# Patient Record
Sex: FEMALE | Race: WHITE | Employment: FULL TIME | URBAN - METROPOLITAN AREA
[De-identification: names, ages, dates, MRNs, and addresses within clinical notes are randomized per-mention and may not be internally consistent; named-entity substitution may affect disease eponyms.]

---

## 2017-09-04 ENCOUNTER — APPOINTMENT (OUTPATIENT)
Dept: CT IMAGING | Age: 27
DRG: 392 | End: 2017-09-04
Attending: EMERGENCY MEDICINE
Payer: COMMERCIAL

## 2017-09-04 ENCOUNTER — HOSPITAL ENCOUNTER (INPATIENT)
Age: 27
LOS: 2 days | Discharge: HOME | DRG: 392 | End: 2017-09-06
Attending: EMERGENCY MEDICINE | Admitting: INTERNAL MEDICINE
Payer: COMMERCIAL

## 2017-09-04 DIAGNOSIS — R19.7 DIARRHEA, UNSPECIFIED TYPE: ICD-10-CM

## 2017-09-04 DIAGNOSIS — R10.84 ABDOMINAL PAIN, GENERALIZED: ICD-10-CM

## 2017-09-04 DIAGNOSIS — R11.2 NON-INTRACTABLE VOMITING WITH NAUSEA, UNSPECIFIED VOMITING TYPE: ICD-10-CM

## 2017-09-04 DIAGNOSIS — K52.9 ENTERITIS: Primary | ICD-10-CM

## 2017-09-04 PROBLEM — N17.9 AKI (ACUTE KIDNEY INJURY) (HCC): Status: ACTIVE | Noted: 2017-09-04

## 2017-09-04 PROBLEM — A41.9 SEPSIS (HCC): Status: ACTIVE | Noted: 2017-09-04

## 2017-09-04 PROBLEM — E87.1 HYPONATREMIA: Status: ACTIVE | Noted: 2017-09-04

## 2017-09-04 LAB
ALBUMIN SERPL-MCNC: 3.4 G/DL (ref 3.5–5)
ALBUMIN/GLOB SERPL: 1 {RATIO} (ref 1.1–2.2)
ALP SERPL-CCNC: 93 U/L (ref 45–117)
ALT SERPL-CCNC: 50 U/L (ref 12–78)
ANION GAP SERPL CALC-SCNC: 9 MMOL/L (ref 5–15)
APPEARANCE UR: ABNORMAL
AST SERPL-CCNC: 53 U/L (ref 15–37)
BACTERIA URNS QL MICRO: ABNORMAL /HPF
BASOPHILS # BLD: 0 K/UL
BASOPHILS NFR BLD: 0 %
BILIRUB SERPL-MCNC: 0.6 MG/DL (ref 0.2–1)
BILIRUB UR QL: NEGATIVE
BUN SERPL-MCNC: 24 MG/DL (ref 6–20)
BUN/CREAT SERPL: 18 (ref 12–20)
CALCIUM SERPL-MCNC: 8 MG/DL (ref 8.5–10.1)
CHLORIDE SERPL-SCNC: 101 MMOL/L (ref 97–108)
CO2 SERPL-SCNC: 25 MMOL/L (ref 21–32)
COLOR UR: ABNORMAL
CREAT SERPL-MCNC: 1.35 MG/DL (ref 0.55–1.02)
DIFFERENTIAL METHOD BLD: ABNORMAL
EOSINOPHIL # BLD: 0 K/UL
EOSINOPHIL NFR BLD: 0 %
EPITH CASTS URNS QL MICRO: ABNORMAL /LPF
ERYTHROCYTE [DISTWIDTH] IN BLOOD BY AUTOMATED COUNT: 12.6 % (ref 11.5–14.5)
GLOBULIN SER CALC-MCNC: 3.5 G/DL (ref 2–4)
GLUCOSE SERPL-MCNC: 154 MG/DL (ref 65–100)
GLUCOSE UR STRIP.AUTO-MCNC: NEGATIVE MG/DL
HCG UR QL: NEGATIVE
HCT VFR BLD AUTO: 54.9 % (ref 35–47)
HGB BLD-MCNC: 19.1 G/DL (ref 11.5–16)
HGB UR QL STRIP: NEGATIVE
HYALINE CASTS URNS QL MICRO: ABNORMAL /LPF (ref 0–5)
KETONES UR QL STRIP.AUTO: NEGATIVE MG/DL
LACTATE SERPL-SCNC: 1.4 MMOL/L (ref 0.4–2)
LACTATE SERPL-SCNC: 2.3 MMOL/L (ref 0.4–2)
LEUKOCYTE ESTERASE UR QL STRIP.AUTO: NEGATIVE
LIPASE SERPL-CCNC: 70 U/L (ref 73–393)
LYMPHOCYTES # BLD: 5.6 K/UL
LYMPHOCYTES NFR BLD: 19 %
MCH RBC QN AUTO: 33.2 PG (ref 26–34)
MCHC RBC AUTO-ENTMCNC: 34.8 G/DL (ref 30–36.5)
MCV RBC AUTO: 95.5 FL (ref 80–99)
MONOCYTES # BLD: 2.1 K/UL
MONOCYTES NFR BLD: 7 %
NEUTS BAND NFR BLD MANUAL: 7 %
NEUTS SEG # BLD: 21.7 K/UL
NEUTS SEG NFR BLD: 67 %
NITRITE UR QL STRIP.AUTO: NEGATIVE
PH UR STRIP: 5.5 [PH] (ref 5–8)
PLATELET # BLD AUTO: 367 K/UL (ref 150–400)
POTASSIUM SERPL-SCNC: 4.7 MMOL/L (ref 3.5–5.1)
PROT SERPL-MCNC: 6.9 G/DL (ref 6.4–8.2)
PROT UR STRIP-MCNC: ABNORMAL MG/DL
RBC # BLD AUTO: 5.75 M/UL (ref 3.8–5.2)
RBC #/AREA URNS HPF: ABNORMAL /HPF (ref 0–5)
RBC MORPH BLD: ABNORMAL
SODIUM SERPL-SCNC: 135 MMOL/L (ref 136–145)
SP GR UR REFRACTOMETRY: >1.03 (ref 1–1.03)
UA: UC IF INDICATED,UAUC: ABNORMAL
UROBILINOGEN UR QL STRIP.AUTO: 0.2 EU/DL (ref 0.2–1)
WBC # BLD AUTO: 29.4 K/UL (ref 3.6–11)
WBC URNS QL MICRO: ABNORMAL /HPF (ref 0–4)

## 2017-09-04 PROCEDURE — 65270000015 HC RM PRIVATE ONCOLOGY

## 2017-09-04 PROCEDURE — 74011636320 HC RX REV CODE- 636/320: Performed by: EMERGENCY MEDICINE

## 2017-09-04 PROCEDURE — 74011250636 HC RX REV CODE- 250/636: Performed by: EMERGENCY MEDICINE

## 2017-09-04 PROCEDURE — 74011250636 HC RX REV CODE- 250/636: Performed by: INTERNAL MEDICINE

## 2017-09-04 PROCEDURE — 81025 URINE PREGNANCY TEST: CPT

## 2017-09-04 PROCEDURE — 87209 SMEAR COMPLEX STAIN: CPT | Performed by: INTERNAL MEDICINE

## 2017-09-04 PROCEDURE — 85025 COMPLETE CBC W/AUTO DIFF WBC: CPT | Performed by: EMERGENCY MEDICINE

## 2017-09-04 PROCEDURE — 87493 C DIFF AMPLIFIED PROBE: CPT | Performed by: INTERNAL MEDICINE

## 2017-09-04 PROCEDURE — 74177 CT ABD & PELVIS W/CONTRAST: CPT

## 2017-09-04 PROCEDURE — 93005 ELECTROCARDIOGRAM TRACING: CPT

## 2017-09-04 PROCEDURE — 87086 URINE CULTURE/COLONY COUNT: CPT | Performed by: EMERGENCY MEDICINE

## 2017-09-04 PROCEDURE — 96361 HYDRATE IV INFUSION ADD-ON: CPT

## 2017-09-04 PROCEDURE — 87045 FECES CULTURE AEROBIC BACT: CPT | Performed by: INTERNAL MEDICINE

## 2017-09-04 PROCEDURE — 99285 EMERGENCY DEPT VISIT HI MDM: CPT

## 2017-09-04 PROCEDURE — 81001 URINALYSIS AUTO W/SCOPE: CPT | Performed by: EMERGENCY MEDICINE

## 2017-09-04 PROCEDURE — 89055 LEUKOCYTE ASSESSMENT FECAL: CPT | Performed by: INTERNAL MEDICINE

## 2017-09-04 PROCEDURE — 80053 COMPREHEN METABOLIC PANEL: CPT | Performed by: EMERGENCY MEDICINE

## 2017-09-04 PROCEDURE — 74011250637 HC RX REV CODE- 250/637: Performed by: INTERNAL MEDICINE

## 2017-09-04 PROCEDURE — 36415 COLL VENOUS BLD VENIPUNCTURE: CPT | Performed by: EMERGENCY MEDICINE

## 2017-09-04 PROCEDURE — 83690 ASSAY OF LIPASE: CPT | Performed by: EMERGENCY MEDICINE

## 2017-09-04 PROCEDURE — 96374 THER/PROPH/DIAG INJ IV PUSH: CPT

## 2017-09-04 PROCEDURE — 83605 ASSAY OF LACTIC ACID: CPT | Performed by: EMERGENCY MEDICINE

## 2017-09-04 PROCEDURE — 74011000250 HC RX REV CODE- 250: Performed by: INTERNAL MEDICINE

## 2017-09-04 RX ORDER — ACETAMINOPHEN 325 MG/1
650 TABLET ORAL
Status: DISCONTINUED | OUTPATIENT
Start: 2017-09-04 | End: 2017-09-06 | Stop reason: HOSPADM

## 2017-09-04 RX ORDER — ONDANSETRON 2 MG/ML
4 INJECTION INTRAMUSCULAR; INTRAVENOUS
Status: COMPLETED | OUTPATIENT
Start: 2017-09-04 | End: 2017-09-04

## 2017-09-04 RX ORDER — IBUPROFEN 200 MG
1 TABLET ORAL EVERY 24 HOURS
Status: DISCONTINUED | OUTPATIENT
Start: 2017-09-04 | End: 2017-09-06 | Stop reason: HOSPADM

## 2017-09-04 RX ORDER — SODIUM CHLORIDE 9 MG/ML
100 INJECTION, SOLUTION INTRAVENOUS CONTINUOUS
Status: DISCONTINUED | OUTPATIENT
Start: 2017-09-04 | End: 2017-09-06

## 2017-09-04 RX ORDER — SODIUM CHLORIDE 0.9 % (FLUSH) 0.9 %
5-10 SYRINGE (ML) INJECTION EVERY 8 HOURS
Status: DISCONTINUED | OUTPATIENT
Start: 2017-09-04 | End: 2017-09-06 | Stop reason: HOSPADM

## 2017-09-04 RX ORDER — ENOXAPARIN SODIUM 100 MG/ML
40 INJECTION SUBCUTANEOUS EVERY 24 HOURS
Status: DISCONTINUED | OUTPATIENT
Start: 2017-09-04 | End: 2017-09-06 | Stop reason: HOSPADM

## 2017-09-04 RX ORDER — SODIUM CHLORIDE 0.9 % (FLUSH) 0.9 %
5-10 SYRINGE (ML) INJECTION AS NEEDED
Status: DISCONTINUED | OUTPATIENT
Start: 2017-09-04 | End: 2017-09-06 | Stop reason: HOSPADM

## 2017-09-04 RX ORDER — MORPHINE SULFATE 2 MG/ML
1 INJECTION, SOLUTION INTRAMUSCULAR; INTRAVENOUS
Status: DISCONTINUED | OUTPATIENT
Start: 2017-09-04 | End: 2017-09-06 | Stop reason: HOSPADM

## 2017-09-04 RX ORDER — SODIUM CHLORIDE 0.9 % (FLUSH) 0.9 %
10 SYRINGE (ML) INJECTION
Status: COMPLETED | OUTPATIENT
Start: 2017-09-04 | End: 2017-09-04

## 2017-09-04 RX ORDER — LEVOFLOXACIN 5 MG/ML
500 INJECTION, SOLUTION INTRAVENOUS
Status: COMPLETED | OUTPATIENT
Start: 2017-09-04 | End: 2017-09-04

## 2017-09-04 RX ORDER — METRONIDAZOLE 500 MG/100ML
500 INJECTION, SOLUTION INTRAVENOUS
Status: DISCONTINUED | OUTPATIENT
Start: 2017-09-04 | End: 2017-09-04

## 2017-09-04 RX ORDER — LEVOFLOXACIN 5 MG/ML
750 INJECTION, SOLUTION INTRAVENOUS EVERY 24 HOURS
Status: DISCONTINUED | OUTPATIENT
Start: 2017-09-05 | End: 2017-09-06

## 2017-09-04 RX ORDER — PROPOFOL 10 MG/ML
100 VIAL (ML) INTRAVENOUS
Status: DISCONTINUED | OUTPATIENT
Start: 2017-09-04 | End: 2017-09-04

## 2017-09-04 RX ORDER — ONDANSETRON 2 MG/ML
4 INJECTION INTRAMUSCULAR; INTRAVENOUS
Status: DISCONTINUED | OUTPATIENT
Start: 2017-09-04 | End: 2017-09-06 | Stop reason: HOSPADM

## 2017-09-04 RX ORDER — METRONIDAZOLE 500 MG/100ML
500 INJECTION, SOLUTION INTRAVENOUS EVERY 8 HOURS
Status: DISCONTINUED | OUTPATIENT
Start: 2017-09-04 | End: 2017-09-06

## 2017-09-04 RX ORDER — IBUPROFEN 200 MG
1 TABLET ORAL DAILY
Status: DISCONTINUED | OUTPATIENT
Start: 2017-09-05 | End: 2017-09-04

## 2017-09-04 RX ORDER — SODIUM CHLORIDE 9 MG/ML
50 INJECTION, SOLUTION INTRAVENOUS
Status: COMPLETED | OUTPATIENT
Start: 2017-09-04 | End: 2017-09-04

## 2017-09-04 RX ADMIN — SODIUM CHLORIDE 2000 ML: 900 INJECTION, SOLUTION INTRAVENOUS at 16:32

## 2017-09-04 RX ADMIN — SODIUM CHLORIDE 50 ML/HR: 900 INJECTION, SOLUTION INTRAVENOUS at 18:01

## 2017-09-04 RX ADMIN — Medication 10 ML: at 18:01

## 2017-09-04 RX ADMIN — ONDANSETRON 4 MG: 2 INJECTION INTRAMUSCULAR; INTRAVENOUS at 16:15

## 2017-09-04 RX ADMIN — ONDANSETRON 4 MG: 2 INJECTION INTRAMUSCULAR; INTRAVENOUS at 23:10

## 2017-09-04 RX ADMIN — SODIUM CHLORIDE 1000 ML: 900 INJECTION, SOLUTION INTRAVENOUS at 19:08

## 2017-09-04 RX ADMIN — MORPHINE SULFATE 1 MG: 2 INJECTION, SOLUTION INTRAMUSCULAR; INTRAVENOUS at 23:10

## 2017-09-04 RX ADMIN — IOPAMIDOL 100 ML: 755 INJECTION, SOLUTION INTRAVENOUS at 18:01

## 2017-09-04 RX ADMIN — LEVOFLOXACIN 500 MG: 5 INJECTION, SOLUTION INTRAVENOUS at 19:08

## 2017-09-04 RX ADMIN — METRONIDAZOLE 500 MG: 500 INJECTION, SOLUTION INTRAVENOUS at 21:35

## 2017-09-04 RX ADMIN — SODIUM CHLORIDE 150 ML/HR: 900 INJECTION, SOLUTION INTRAVENOUS at 21:55

## 2017-09-04 RX ADMIN — Medication 10 ML: at 22:52

## 2017-09-04 NOTE — IP AVS SNAPSHOT
[address] [facility] 
[phone] Patient: Celina Pedraza MRN: [id number] RLQ:[id number] You are allergic to the following No active allergies Recent Documentation Height Weight BMI OB Status Smoking Status 1.626 m 76.5 kg 28.95 kg/m2 Having regular periods Current Every Day Smoker Unresulted Labs Order Current Status OVA & PARASITES, STOOL In process Emergency Contacts  (Rel.) Home Phone Work Phone Mobile Phone Wes Oro (Mother) [phone] -- -- About your hospitalization You were admitted on:  September 4, 2017 You last received care in the:  [facility] 1 MEDICAL ONCOLOGY You were discharged on:  September 6, 2017 Why you were hospitalized Your primary diagnosis was:  Not on File Your diagnoses also included:  Sepsis (Hcc), Nausea Vomiting And Diarrhea, Colitis, Enteritis, And Gastroenteritis Of Presumed Infectious Origin, Ervin (Acute Kidney Injury) (Hcc), Hyponatremia, Severe Dehydration Providers Seen During Your Hospitalizations Provider Role Specialty Primary office phone Viola Fontanez MD Attending Provider Emergency Medicine [phone] Yamilex Torres MD Attending Provider Internal Medicine [phone] Your Primary Care Physician (PCP) Primary Care Physician Office Phone Office Fax NONE ** None ** ** None ** Follow-up Information None Current Discharge Medication List  
  
START taking these medications Dose & Instructions Dispensing Information Comments Morning Noon Evening Bedtime  
 ciprofloxacin HCl 500 mg tablet Commonly known as:  CIPRO Your last dose was: Your next dose is:    
   
   
 Dose:  500 mg Take 1 Tab by mouth two (2) times a day for 5 days. Quantity:  10 Tab Refills:  0 L. acidoph & paracasei- S therm- Bifido 8 billion cell Cap cap Commonly known as:  JENNIFFER-Q/RISAQUAD Your last dose was: Your next dose is:    
   
   
 Dose:  1 Cap Take 1 Cap by mouth daily. Quantity:  7 Cap Refills:  0  
     
   
   
   
  
 metroNIDAZOLE 500 mg tablet Commonly known as:  FLAGYL Your last dose was: Your next dose is:    
   
   
 Dose:  500 mg Take 1 Tab by mouth three (3) times daily for 5 days. Quantity:  15 Tab Refills:  0  
     
   
   
   
  
 ondansetron hcl 4 mg tablet Commonly known as:  ZOFRAN (AS HYDROCHLORIDE) Your last dose was: Your next dose is:    
   
   
 Dose:  4 mg Take 1 Tab by mouth every eight (8) hours as needed for Nausea. Quantity:  20 Tab Refills:  0 Where to Get Your Medications Information on where to get these meds will be given to you by the nurse or doctor. ! Ask your nurse or doctor about these medications  
  ciprofloxacin HCl 500 mg tablet L. acidoph & paracasei- S therm- Bifido 8 billion cell Cap cap  
 metroNIDAZOLE 500 mg tablet  
 ondansetron hcl 4 mg tablet Discharge Instructions HOSPITALIST DISCHARGE INSTRUCTIONS 
 
NAME: Lucio Gibson :  1990 MRN:  [id number] Date/Time:  2017 11:44 AM 
 
ADMIT DATE: 2017 DISCHARGE DATE: 2017 DISCHARGE DIAGNOSIS: 
Sepsis POA - resolved Nausea vomiting, diarrhea POA- improved Abdominal pain POA- now just soreness, improved MAYNOR (acute kidney injury) POA- resolved Hyponatremia (mild/borderline) POA- now resolved Due to Enterocolitis POA- on CT - cont PO Abx as prescribed x 5 more days Active Problems: 
  Sepsis ([address] (2017) Nausea vomiting and diarrhea (2017) Colitis, enteritis, and gastroenteritis of presumed infectious origin (2017) MAYNOR (acute kidney injury) ([address] (2017) Hyponatremia (2017) Severe dehydration (9/5/2017) MEDICATIONS: 
As per medication reconciliation  list 
· It is important that you take the medication exactly as they are prescribed. · Keep your medication in the bottles provided by the pharmacist and keep a list of the medication names, dosages, and times to be taken in your wallet. · Do not take other medications without consulting your doctor. Pain Management: per above medications What to do at [facility] Recommended diet:  Regular Diet Recommended activity: Activity as tolerated If you have questions regarding the hospital related prescriptions or hospital related issues please call Harpreet Phillip at . If you experience any of the following symptoms then please call your primary care physician or return to the emergency room if you cannot get hold of your doctor: 
Fever, chills, nausea, vomiting, diarrhea, change in mentation, falling, bleeding, shortness of breath, Follow Up: 
GI doctor of your choice if diarrhea/Abd pain persists beyond 7 days or recurrence happens for no obvious reason-- may need Colonoscopy as discussed Information obtained by : 
I understand that if any problems occur once I am at home I am to contact my physician. I understand and acknowledge receipt of the instructions indicated above. Physician's or R.N.'s Signature                                                                  Date/Time Patient or Representative Signature                                                          Date/Time Discharge Orders None MyChart Announcement  We are excited to announce that we are making your provider's discharge notes available to you in HitchedPic. You will see these notes when they are completed and signed by the physician that discharged you from your recent hospital stay. If you have any questions or concerns about any information you see in HitchedPic, please call the Health Information Department where you were seen or reach out to your Primary Care Provider for more information about your plan of care. Introducing hospitals & [facility] SERVICES! Yovanny Lovelace introduces HitchedPic patient portal. Now you can access parts of your medical record, email your doctor's office, and request medication refills online. 1. In your internet browser, go to [email or web address] 2. Click on the First Time User? Click Here link in the Sign In box. You will see the New Member Sign Up page. 3. Enter your HitchedPic Access Code exactly as it appears below. You will not need to use this code after youve completed the sign-up process. If you do not sign up before the expiration date, you must request a new code. · HitchedPic Access Code: [email or web address] Expires: 12/3/2017  3:46 PM 
 
4. Enter the last four digits of your Social Security Number (xxxx) and Date of Birth (mm/dd/yyyy) as indicated and click Submit. You will be taken to the next sign-up page. 5. Create a HitchedPic ID. This will be your HitchedPic login ID and cannot be changed, so think of one that is secure and easy to remember. 6. Create a HitchedPic password. You can change your password at any time. 7. Enter your Password Reset Question and Answer. This can be used at a later time if you forget your password. 8. Enter your e-mail address. You will receive e-mail notification when new information is available in [address] 9. Click Sign Up. You can now view and download portions of your medical record. 10. Click the Download Summary menu link to download a portable copy of your medical information. If you have questions, please visit the Frequently Asked Questions section of the MyChart website. Remember, MyChart is NOT to be used for urgent needs. For medical emergencies, dial 911. Now available from your iPhone and Android! General Information Please provide this summary of care documentation to your next provider. Patient Signature:  ____________________________________________________________ Date:  ____________________________________________________________  
  
[facility] Provider Signature:  ____________________________________________________________ Date:  ____________________________________________________________

## 2017-09-04 NOTE — ED PROVIDER NOTES
HPI Comments: Tracee Calvillo is a 28 y.o. female without PMHx, presenting ambulatory to ED c/o persistent non-bloody, non-bilious N/V/D with associated 7/10 generalized abdominal cramping pain and chills since last night. Pt notes she attempted taking pedialyte this morning but has been unable to tolerate PO with symptoms. She denies recent sick contacts, PMHx, or abdominal surgeries. Pt notes her most recent menstrual period ended last week. She reports she smokes 1/2 ppd and social EtOH use. Pt specifically denies fever or urinary symptoms. PCP: None  Social Hx: current every day smoker (1/2 ppd); + EtOH (socially); There are no other complaints, changes, or physical findings at this time. Written by CHARLINE Taveras, as dictated by Madonna Allred MD.          The history is provided by the patient. No past medical history on file. No past surgical history on file. Family History:   Problem Relation Age of Onset    No Known Problems Mother     No Known Problems Father        Social History     Social History    Marital status: SINGLE     Spouse name: N/A    Number of children: N/A    Years of education: N/A     Occupational History    Not on file. Social History Main Topics    Smoking status: Current Every Day Smoker    Smokeless tobacco: Not on file    Alcohol use Yes    Drug use: Not on file    Sexual activity: Not on file     Other Topics Concern    Not on file     Social History Narrative         ALLERGIES: Review of patient's allergies indicates no known allergies. Review of Systems   Constitutional: Positive for chills. Negative for activity change and fever. HENT: Negative for congestion and sore throat. Eyes: Negative for pain and redness. Respiratory: Negative for cough, chest tightness and shortness of breath. Cardiovascular: Negative for chest pain and palpitations.    Gastrointestinal: Positive for abdominal pain (generalized cramping), diarrhea, nausea and vomiting. Genitourinary: Negative for dysuria, frequency and urgency. Musculoskeletal: Negative for back pain and neck pain. Skin: Negative for rash. Neurological: Negative for syncope, light-headedness and headaches. Psychiatric/Behavioral: Negative for confusion. All other systems reviewed and are negative. Patient Vitals for the past 12 hrs:   Temp Pulse Resp BP SpO2   09/04/17 1747 - (!) 118 16 111/75 99 %   09/04/17 1700 - - - 122/89 100 %   09/04/17 1615 - (!) 138 - (!) 110/98 97 %   09/04/17 1537 97.7 °F (36.5 °C) (!) 158 18 115/86 97 %     Physical Exam   Constitutional: She is oriented to person, place, and time. She appears well-developed and well-nourished. No distress. HENT:   Head: Normocephalic. Nose: Nose normal.   Mouth/Throat: Oropharynx is clear and moist. No oropharyngeal exudate. Eyes: Conjunctivae are normal. Pupils are equal, round, and reactive to light. No scleral icterus. Neck: Normal range of motion. Neck supple. No JVD present. No tracheal deviation present. No thyromegaly present. Cardiovascular: Regular rhythm and intact distal pulses. Tachycardia present. Exam reveals no gallop and no friction rub. No murmur heard. Pulmonary/Chest: Effort normal and breath sounds normal. No stridor. No respiratory distress. She has no wheezes. She has no rales. Abdominal: Soft. Bowel sounds are normal. She exhibits no distension. There is no tenderness. There is no rebound and no guarding. Musculoskeletal: Normal range of motion. She exhibits no edema. Lymphadenopathy:     She has no cervical adenopathy. Neurological: She is alert and oriented to person, place, and time. No cranial nerve deficit. She exhibits normal muscle tone. Coordination normal.   Skin: Skin is warm and dry. No rash noted. She is not diaphoretic. No erythema. Psychiatric: She has a normal mood and affect. Her behavior is normal.   Nursing note and vitals reviewed. MDM  Number of Diagnoses or Management Options  Abdominal pain, generalized:   Diarrhea, unspecified type:   Enteritis:   Non-intractable vomiting with nausea, unspecified vomiting type:   Diagnosis management comments: DDx: gastroenteritis, metabolic abnormality, dehydration. Amount and/or Complexity of Data Reviewed  Clinical lab tests: ordered and reviewed  Tests in the radiology section of CPT®: ordered and reviewed  Tests in the medicine section of CPT®: ordered and reviewed  Review and summarize past medical records: yes  Discuss the patient with other providers: yes (Hospitalist)  Independent visualization of images, tracings, or specimens: yes    Risk of Complications, Morbidity, and/or Mortality  Presenting problems: high  Diagnostic procedures: high  Management options: high  General comments: Will admit to hospitalist for enteritis; afebrile; pulse improved with aggressive fluid resuscitation; bp's stable; starting iv levaquin and flagyl; Viola Fontanez MD      Critical Care  Total time providing critical care: 30-74 minutes    Patient Progress  Patient progress: stable    Procedures    ED EKG interpretation: 15:39  Rhythm: sinus tachycardia; and regular . Rate (approx.): 142; Axis: normal; NE Interval: normal; QRS interval: normal ; ST/T wave: normal; This EKG was interpreted by Viola Fontanez MD,ED Provider. CONSULT NOTE:   6:45 PM  Viola Fontanez MD spoke with Dr. Drew Perez,   Specialty: Hospitalist  Discussed pt's hx, disposition, and available diagnostic and imaging results. Reviewed care plans. Consultant will evaluate pt for admission.   Written by Ingris Aldana ED Scribe, as dictated by Viola Fontanez MD.     LABORATORY TESTS:  Recent Results (from the past 12 hour(s))   EKG, 12 LEAD, INITIAL    Collection Time: 09/04/17  3:39 PM   Result Value Ref Range    Ventricular Rate 142 BPM    Atrial Rate 142 BPM    P-R Interval 114 ms    QRS Duration 68 ms    Q-T Interval 288 ms    QTC Calculation (Bezet) 443 ms    Calculated P Axis 67 degrees    Calculated R Axis 48 degrees    Calculated T Axis 70 degrees    Diagnosis       Sinus tachycardia  Possible Left atrial enlargement  No previous ECGs available     CBC WITH AUTOMATED DIFF    Collection Time: 09/04/17  4:17 PM   Result Value Ref Range    WBC 29.4 (H) 3.6 - 11.0 K/uL    RBC 5.75 (H) 3.80 - 5.20 M/uL    HGB 19.1 (H) 11.5 - 16.0 g/dL    HCT 54.9 (H) 35.0 - 47.0 %    MCV 95.5 80.0 - 99.0 FL    MCH 33.2 26.0 - 34.0 PG    MCHC 34.8 30.0 - 36.5 g/dL    RDW 12.6 11.5 - 14.5 %    PLATELET 343 779 - 078 K/uL    NEUTROPHILS 67 %    BAND NEUTROPHILS 7 %    LYMPHOCYTES 19 %    MONOCYTES 7 %    EOSINOPHILS 0 %    BASOPHILS 0 %    ABS. NEUTROPHILS 21.7 K/UL    ABS. LYMPHOCYTES 5.6 K/UL    ABS. MONOCYTES 2.1 K/UL    ABS. EOSINOPHILS 0.0 K/UL    ABS. BASOPHILS 0.0 K/UL    DF MANUAL      RBC COMMENTS NORMOCYTIC, NORMOCHROMIC     METABOLIC PANEL, COMPREHENSIVE    Collection Time: 09/04/17  4:17 PM   Result Value Ref Range    Sodium 135 (L) 136 - 145 mmol/L    Potassium 4.7 3.5 - 5.1 mmol/L    Chloride 101 97 - 108 mmol/L    CO2 25 21 - 32 mmol/L    Anion gap 9 5 - 15 mmol/L    Glucose 154 (H) 65 - 100 mg/dL    BUN 24 (H) 6 - 20 MG/DL    Creatinine 1.35 (H) 0.55 - 1.02 MG/DL    BUN/Creatinine ratio 18 12 - 20      GFR est AA 57 (L) >60 ml/min/1.73m2    GFR est non-AA 47 (L) >60 ml/min/1.73m2    Calcium 8.0 (L) 8.5 - 10.1 MG/DL    Bilirubin, total 0.6 0.2 - 1.0 MG/DL    ALT (SGPT) 50 12 - 78 U/L    AST (SGOT) 53 (H) 15 - 37 U/L    Alk.  phosphatase 93 45 - 117 U/L    Protein, total 6.9 6.4 - 8.2 g/dL    Albumin 3.4 (L) 3.5 - 5.0 g/dL    Globulin 3.5 2.0 - 4.0 g/dL    A-G Ratio 1.0 (L) 1.1 - 2.2     LIPASE    Collection Time: 09/04/17  4:17 PM   Result Value Ref Range    Lipase 70 (L) 73 - 393 U/L   LACTIC ACID    Collection Time: 09/04/17  4:17 PM   Result Value Ref Range    Lactic acid 2.3 (HH) 0.4 - 2.0 MMOL/L   URINALYSIS W/ REFLEX CULTURE    Collection Time: 09/04/17  5:41 PM   Result Value Ref Range    Color DARK YELLOW      Appearance CLOUDY (A) CLEAR      Specific gravity >1.030 (H) 1.003 - 1.030    pH (UA) 5.5 5.0 - 8.0      Protein TRACE (A) NEG mg/dL    Glucose NEGATIVE  NEG mg/dL    Ketone NEGATIVE  NEG mg/dL    Bilirubin NEGATIVE  NEG      Blood NEGATIVE  NEG      Urobilinogen 0.2 0.2 - 1.0 EU/dL    Nitrites NEGATIVE  NEG      Leukocyte Esterase NEGATIVE  NEG      WBC 5-10 0 - 4 /hpf    RBC 5-10 0 - 5 /hpf    Epithelial cells MODERATE (A) FEW /lpf    Bacteria 2+ (A) NEG /hpf    UA:UC IF INDICATED URINE CULTURE ORDERED (A) CNI      Hyaline cast 0-2 0 - 5 /lpf   HCG URINE, QL. - POC    Collection Time: 09/04/17  5:41 PM   Result Value Ref Range    Pregnancy test,urine (POC) NEGATIVE  NEG         IMAGING RESULTS:  INDICATION: Low abdominal pain with vomiting and diarrhea; leukocytosis 29     COMPARISON: None     TECHNIQUE:   Following the uneventful intravenous administration of 100 cc Isovue-370, thin  axial images were obtained through the abdomen and pelvis. Coronal and sagittal  reconstructions were generated. Oral contrast was not administered. CT dose  reduction was achieved through use of a standardized protocol tailored for this  examination and automatic exposure control for dose modulation.     FINDINGS:   LUNG BASES: Clear. INCIDENTALLY IMAGED HEART AND MEDIASTINUM: Unremarkable. LIVER: Unremarkable  GALLBLADDER: Unremarkable. SPLEEN: Normal in size. PANCREAS: Unremarkable. ADRENALS: Unremarkable. KIDNEYS: No calculus or hydronephrosis.     STOMACH: Unremarkable. SMALL BOWEL: Normal caliber proximal small bowel. Prominent diffuse thickening  wall distal small bowel. Mild edematous change mesentery. COLON: Mild thickening wall colon. APPENDIX: Slightly distended appendix.     PERITONEUM: Mild to moderate amount of ascites peritoneal cavity greatest in the  pelvis. RETROPERITONEUM: Normal caliber abdominal aorta.  No lymphadenopathy. REPRODUCTIVE ORGANS: Normal uterine size. URINARY BLADDER: Nondistended. BONES: No destructive bone lesion.     IMPRESSION  IMPRESSION:     Diffuse severe distal small bowel enteritis.     Mild colitis.     Ascites throughout the peritoneal cavity greatest in the pelvis.     Findings discussed with Dr. Nieves Morel in the ER.           CRITICAL CARE NOTE :    9:10 PM      IMPENDING DETERIORATION -Cardiovascular and Metabolic    ASSOCIATED RISK FACTORS - Shock, Dysrhythmia, Metabolic changes, Dehydration and Vascular Compromise    MANAGEMENT- Bedside Assessment and Supervision of Care    INTERPRETATION -  ECG, blood pressure    INTERVENTIONS - hemodynamic mngmt and Metobolic interventions    CASE REVIEW - Hospitalist and Medical Sub-Specialist    TREATMENT RESPONSE -Improved    PERFORMED BY - Self        NOTES   :      I have spent 50 minutes of critical care time involved in lab review, consultations with specialist, family decision- making, bedside attention and documentation. During this entire length of time I was immediately available to the patient . Significant sinus tach requiring 3L of fluid resuscitation;       Critical Care: The reason for providing this level of medical care for this critically ill patient was due to a critical illness that impaired one or more vital organ systems, such that there was a high probability of imminent or life threatening deterioration in the patient's condition. This care involved high complexity decision making to assess, manipulate, and support vital system functions, to treat this degree of vital organ system failure, and to prevent further life threatening deterioration of the patients condition.   Nieves Morel MD      MEDICATIONS GIVEN:  Medications   levoFLOXacin (LEVAQUIN) 500 mg in D5W IVPB (500 mg IntraVENous New Bag 9/4/17 1908)   sodium chloride 0.9 % bolus infusion 1,000 mL (1,000 mL IntraVENous New Bag 9/4/17 1908)   0.9% sodium chloride infusion (not administered)   levoFLOXacin (LEVAQUIN) 750 mg in D5W IVPB (not administered)   metroNIDAZOLE (FLAGYL) IVPB premix 500 mg (not administered)   nicotine (NICODERM CQ) 14 mg/24 hr patch 1 Patch (not administered)   sodium chloride 0.9 % bolus infusion 2,000 mL (0 mL IntraVENous IV Completed 9/4/17 1832)   ondansetron (ZOFRAN) injection 4 mg (4 mg IntraVENous Given 9/4/17 1615)   0.9% sodium chloride infusion (0 mL/hr IntraVENous IV Completed 9/4/17 1907)   iopamidol (ISOVUE-370) 76 % injection 100 mL (100 mL IntraVENous Given 9/4/17 1801)   sodium chloride (NS) flush 10 mL (10 mL IntraVENous Given 9/4/17 1801)       IMPRESSION:  1. Enteritis    2. Abdominal pain, generalized    3. Non-intractable vomiting with nausea, unspecified vomiting type    4. Diarrhea, unspecified type      PLAN:  1. Admit to Hospitalist.     6:45 PM  Patient is being admitted to the hospital by Dr. Graciela Scales. The results of their tests and reasons for their admission have been discussed with them and/or available family. They convey agreement and understanding for the need to be admitted and for their admission diagnosis. Consultation has been made with the inpatient physician specialist for hospitalization. Written by Iliana Hartmann ED Scribe, as dictated by Efrain Broussard MD.     This note is prepared by Iliana Hartmann, acting as Scribe for Efrain Broussard MD.    Efrain Broussard MD: The scribe's documentation has been prepared under my direction and personally reviewed by me in its entirety. I confirm that the note above accurately reflects all work, treatment, procedures, and medical decision making performed by me.

## 2017-09-04 NOTE — H&P
Hospitalist Admission Note    NAME: Patti Mancilla   :  1990   MRN:  [id number]     Date/Time:  2017 7:08 PM    Patient PCP: None  ________________________________________________________________________    My assessment of this patient's clinical condition and my plan of care is as follows. Assessment / Plan:  Sepsis POA with leukocytosis, tachycardia and lactic acidosis  Nausea vomiting, diarrhea and abdominal pain  Mild MAYNOR (acute kidney injury)   Hyponatremia  Due to Enterocolitis   Admit  IVF  Check Stool for C.diff, Cultures, WBCs, Ova / Parasite  If doesn't improve then consider GI consult  Serial LA  Monitor lytes  Clear liquid diet for now, advance as tolerated  PRN 1mg IV morphine  IV Levaquin and Flagyl for now  Beta HCG negative    Smoker  Offered nicotine patch    Code Status: Full    DVT Prophylaxis: Lovenox    Baseline: Functional        Subjective:   CHIEF COMPLAINT: Nausea, vomiting, diarrhea and abdominal pain    HISTORY OF PRESENT ILLNESS:     Patti Mancilla is a 28 y.o.  female who presents with Nausea, vomiting, diarrhea and abdominal pain. As per patient, symptoms started yesterday evening. Pt reported constant, nausea, vomiting, diarrhea with ? 10 episodes since last night (she doesn't know the exact count). Pt reported diffuse abdominal pain, cramping in nature, 6-8/10 in intensity, constant. Pt denies any fever, chills, chest pain, problems urination. Pt also denies recent travel, recent abx use, sick contact and eating anything unusual.     We were asked to admit for work up and evaluation of the above problems.      Past medical history: She is unaware about any medical problems as she doesn't followup with any PCP    Past surgical history: Denies history of any pertinent surgeries    Social History   Substance Use Topics    Smoking status: Current Every Day Smoker    Smokeless tobacco: Never Used    Alcohol use Yes        Family History Problem Relation Age of Onset    No Known Problems Mother     No Known Problems Father      No Known Allergies     Prior to Admission medications    Not on File       REVIEW OF SYSTEMS:     I am not able to complete the review of systems because: The patient is intubated and sedated    The patient has altered mental status due to his acute medical problems    The patient has baseline aphasia from prior stroke(s)    The patient has baseline dementia and is not reliable historian    The patient is in acute medical distress and unable to provide information           Total of 12 systems reviewed as follows:       POSITIVE= underlined text  Negative = text not underlined  General:  fever, chills, sweats, generalized weakness, weight loss/gain,      loss of appetite   Eyes:    blurred vision, eye pain, loss of vision, double vision  ENT:    rhinorrhea, pharyngitis   Respiratory:   cough, sputum production, SOB, VITAL, wheezing, pleuritic pain   Cardiology:   chest pain, palpitations, orthopnea, PND, edema, syncope   Gastrointestinal:  abdominal pain , N/V, diarrhea, dysphagia, constipation, bleeding   Genitourinary:  frequency, urgency, dysuria, hematuria, incontinence   Muskuloskeletal :  arthralgia, myalgia, back pain  Hematology:  easy bruising, nose or gum bleeding, lymphadenopathy   Dermatological: rash, ulceration, pruritis, color change / jaundice  Endocrine:   hot flashes or polydipsia   Neurological:  headache, dizziness, confusion, focal weakness, paresthesia,     Speech difficulties, memory loss, gait difficulty  Psychological: Feelings of anxiety, depression, agitation    Objective:   VITALS:    Visit Vitals    /75    Pulse (!) 118    Temp 97.7 °F (36.5 °C)    Resp 16    Ht 5' 4\" (1.626 m)    Wt 76.5 kg (168 lb 10.4 oz)    SpO2 99%    BMI 28.95 kg/m2       PHYSICAL EXAM:    General:    Alert, cooperative, no distress, appears stated age.      HEENT: Atraumatic, anicteric sclerae, pink conjunctivae     No oral ulcers, mucosa dry, throat clear, dentition fair  Neck:  Supple, symmetrical,  thyroid: non tender  Lungs:   Clear to auscultation bilaterally. No Wheezing or Rhonchi. No rales. Chest wall:  No tenderness  No Accessory muscle use. Heart:   Regular  rhythm,  No  murmur   No edema  Abdomen:   Soft, non-tender. Not distended. Bowel sounds normal  Extremities: No cyanosis. No clubbing,      Skin turgor normal, Capillary refill normal, Radial dial pulse 2+  Skin:     Not pale. Not Jaundiced  No rashes   Psych:  Good insight. Not depressed. Not anxious or agitated. Neurologic: EOMs intact. No facial asymmetry. No aphasia or slurred speech. Symmetrical strength, Sensation grossly intact. Alert and oriented X 4.     _______________________________________________________________________  Care Plan discussed with:    Comments   Patient y    Family      RN y    Care Manager                    Consultant:  chris ED physician   _______________________________________________________________________  Expected  Disposition:   Home with Family y   HH/PT/OT/RN    SNF/LTC    RODNEY    ________________________________________________________________________  TOTAL TIME: 61 Minutes    Critical Care Provided     Minutes non procedure based      Comments    y Reviewed previous records   >50% of visit spent in counseling and coordination of care y Discussion with patient and family and questions answered       ________________________________________________________________________  Signed: Meghann Gallegos MD    Procedures: see electronic medical records for all procedures/Xrays and details which were not copied into this note but were reviewed prior to creation of Plan.     LAB DATA REVIEWED:    Recent Results (from the past 24 hour(s))   EKG, 12 LEAD, INITIAL    Collection Time: 09/04/17  3:39 PM   Result Value Ref Range    Ventricular Rate 142 BPM    Atrial Rate 142 BPM    P-R Interval 114 ms    QRS Duration 68 ms    Q-T Interval 288 ms    QTC Calculation (Bezet) 443 ms    Calculated P Axis 67 degrees    Calculated R Axis 48 degrees    Calculated T Axis 70 degrees    Diagnosis       Sinus tachycardia  Possible Left atrial enlargement  No previous ECGs available     CBC WITH AUTOMATED DIFF    Collection Time: 09/04/17  4:17 PM   Result Value Ref Range    WBC 29.4 (H) 3.6 - 11.0 K/uL    RBC 5.75 (H) 3.80 - 5.20 M/uL    HGB 19.1 (H) 11.5 - 16.0 g/dL    HCT 54.9 (H) 35.0 - 47.0 %    MCV 95.5 80.0 - 99.0 FL    MCH 33.2 26.0 - 34.0 PG    MCHC 34.8 30.0 - 36.5 g/dL    RDW 12.6 11.5 - 14.5 %    PLATELET 262 662 - 743 K/uL    NEUTROPHILS 67 %    BAND NEUTROPHILS 7 %    LYMPHOCYTES 19 %    MONOCYTES 7 %    EOSINOPHILS 0 %    BASOPHILS 0 %    ABS. NEUTROPHILS 21.7 K/UL    ABS. LYMPHOCYTES 5.6 K/UL    ABS. MONOCYTES 2.1 K/UL    ABS. EOSINOPHILS 0.0 K/UL    ABS. BASOPHILS 0.0 K/UL    DF MANUAL      RBC COMMENTS NORMOCYTIC, NORMOCHROMIC     METABOLIC PANEL, COMPREHENSIVE    Collection Time: 09/04/17  4:17 PM   Result Value Ref Range    Sodium 135 (L) 136 - 145 mmol/L    Potassium 4.7 3.5 - 5.1 mmol/L    Chloride 101 97 - 108 mmol/L    CO2 25 21 - 32 mmol/L    Anion gap 9 5 - 15 mmol/L    Glucose 154 (H) 65 - 100 mg/dL    BUN 24 (H) 6 - 20 MG/DL    Creatinine 1.35 (H) 0.55 - 1.02 MG/DL    BUN/Creatinine ratio 18 12 - 20      GFR est AA 57 (L) >60 ml/min/1.73m2    GFR est non-AA 47 (L) >60 ml/min/1.73m2    Calcium 8.0 (L) 8.5 - 10.1 MG/DL    Bilirubin, total 0.6 0.2 - 1.0 MG/DL    ALT (SGPT) 50 12 - 78 U/L    AST (SGOT) 53 (H) 15 - 37 U/L    Alk.  phosphatase 93 45 - 117 U/L    Protein, total 6.9 6.4 - 8.2 g/dL    Albumin 3.4 (L) 3.5 - 5.0 g/dL    Globulin 3.5 2.0 - 4.0 g/dL    A-G Ratio 1.0 (L) 1.1 - 2.2     LIPASE    Collection Time: 09/04/17  4:17 PM   Result Value Ref Range    Lipase 70 (L) 73 - 393 U/L   LACTIC ACID    Collection Time: 09/04/17  4:17 PM   Result Value Ref Range    Lactic acid 2.3 (HH) 0.4 - 2.0 MMOL/L   URINALYSIS W/ REFLEX CULTURE    Collection Time: 09/04/17  5:41 PM   Result Value Ref Range    Color DARK YELLOW      Appearance CLOUDY (A) CLEAR      Specific gravity >1.030 (H) 1.003 - 1.030    pH (UA) 5.5 5.0 - 8.0      Protein TRACE (A) NEG mg/dL    Glucose NEGATIVE  NEG mg/dL    Ketone NEGATIVE  NEG mg/dL    Bilirubin NEGATIVE  NEG      Blood NEGATIVE  NEG      Urobilinogen 0.2 0.2 - 1.0 EU/dL    Nitrites NEGATIVE  NEG      Leukocyte Esterase NEGATIVE  NEG      WBC 5-10 0 - 4 /hpf    RBC 5-10 0 - 5 /hpf    Epithelial cells MODERATE (A) FEW /lpf    Bacteria 2+ (A) NEG /hpf    UA:UC IF INDICATED URINE CULTURE ORDERED (A) CNI      Hyaline cast 0-2 0 - 5 /lpf   HCG URINE, QL. - POC    Collection Time: 09/04/17  5:41 PM   Result Value Ref Range    Pregnancy test,urine (POC) NEGATIVE  NEG

## 2017-09-05 PROBLEM — E86.0 SEVERE DEHYDRATION: Status: ACTIVE | Noted: 2017-09-05

## 2017-09-05 LAB
ALBUMIN SERPL-MCNC: 2.4 G/DL (ref 3.5–5)
ALBUMIN/GLOB SERPL: 0.9 {RATIO} (ref 1.1–2.2)
ALP SERPL-CCNC: 60 U/L (ref 45–117)
ALT SERPL-CCNC: 35 U/L (ref 12–78)
ANION GAP SERPL CALC-SCNC: 7 MMOL/L (ref 5–15)
AST SERPL-CCNC: 27 U/L (ref 15–37)
ATRIAL RATE: 142 BPM
BASOPHILS # BLD: 0 K/UL (ref 0–0.1)
BASOPHILS NFR BLD: 0 % (ref 0–1)
BILIRUB SERPL-MCNC: 0.5 MG/DL (ref 0.2–1)
BUN SERPL-MCNC: 15 MG/DL (ref 6–20)
BUN/CREAT SERPL: 22 (ref 12–20)
C DIFF TOX GENS STL QL NAA+PROBE: NEGATIVE
CALCIUM SERPL-MCNC: 6.7 MG/DL (ref 8.5–10.1)
CALCULATED P AXIS, ECG09: 67 DEGREES
CALCULATED R AXIS, ECG10: 48 DEGREES
CALCULATED T AXIS, ECG11: 70 DEGREES
CHLORIDE SERPL-SCNC: 111 MMOL/L (ref 97–108)
CO2 SERPL-SCNC: 21 MMOL/L (ref 21–32)
CREAT SERPL-MCNC: 0.69 MG/DL (ref 0.55–1.02)
DIAGNOSIS, 93000: NORMAL
EOSINOPHIL # BLD: 0.1 K/UL (ref 0–0.4)
EOSINOPHIL NFR BLD: 1 % (ref 0–7)
ERYTHROCYTE [DISTWIDTH] IN BLOOD BY AUTOMATED COUNT: 13 % (ref 11.5–14.5)
GLOBULIN SER CALC-MCNC: 2.6 G/DL (ref 2–4)
GLUCOSE SERPL-MCNC: 104 MG/DL (ref 65–100)
HCT VFR BLD AUTO: 41.8 % (ref 35–47)
HGB BLD-MCNC: 14.5 G/DL (ref 11.5–16)
LACTATE SERPL-SCNC: 0.6 MMOL/L (ref 0.4–2)
LYMPHOCYTES # BLD: 4.2 K/UL (ref 0.8–3.5)
LYMPHOCYTES NFR BLD: 24 % (ref 12–49)
MCH RBC QN AUTO: 32.6 PG (ref 26–34)
MCHC RBC AUTO-ENTMCNC: 34.7 G/DL (ref 30–36.5)
MCV RBC AUTO: 93.9 FL (ref 80–99)
MONOCYTES # BLD: 1.4 K/UL (ref 0–1)
MONOCYTES NFR BLD: 8 % (ref 5–13)
NEUTS SEG # BLD: 11.5 K/UL (ref 1.8–8)
NEUTS SEG NFR BLD: 67 % (ref 32–75)
P-R INTERVAL, ECG05: 114 MS
PLATELET # BLD AUTO: 243 K/UL (ref 150–400)
POTASSIUM SERPL-SCNC: 3.8 MMOL/L (ref 3.5–5.1)
PROT SERPL-MCNC: 5 G/DL (ref 6.4–8.2)
Q-T INTERVAL, ECG07: 288 MS
QRS DURATION, ECG06: 68 MS
QTC CALCULATION (BEZET), ECG08: 443 MS
RBC # BLD AUTO: 4.45 M/UL (ref 3.8–5.2)
SODIUM SERPL-SCNC: 139 MMOL/L (ref 136–145)
VENTRICULAR RATE, ECG03: 142 BPM
WBC # BLD AUTO: 17.2 K/UL (ref 3.6–11)

## 2017-09-05 PROCEDURE — 85025 COMPLETE CBC W/AUTO DIFF WBC: CPT | Performed by: INTERNAL MEDICINE

## 2017-09-05 PROCEDURE — 77030018836 HC SOL IRR NACL ICUM -A

## 2017-09-05 PROCEDURE — 65270000015 HC RM PRIVATE ONCOLOGY

## 2017-09-05 PROCEDURE — 74011000250 HC RX REV CODE- 250: Performed by: INTERNAL MEDICINE

## 2017-09-05 PROCEDURE — 83605 ASSAY OF LACTIC ACID: CPT | Performed by: INTERNAL MEDICINE

## 2017-09-05 PROCEDURE — 74011250637 HC RX REV CODE- 250/637: Performed by: INTERNAL MEDICINE

## 2017-09-05 PROCEDURE — 74011250636 HC RX REV CODE- 250/636: Performed by: INTERNAL MEDICINE

## 2017-09-05 PROCEDURE — 36415 COLL VENOUS BLD VENIPUNCTURE: CPT | Performed by: INTERNAL MEDICINE

## 2017-09-05 PROCEDURE — 80053 COMPREHEN METABOLIC PANEL: CPT | Performed by: INTERNAL MEDICINE

## 2017-09-05 RX ADMIN — MORPHINE SULFATE 1 MG: 2 INJECTION, SOLUTION INTRAMUSCULAR; INTRAVENOUS at 20:50

## 2017-09-05 RX ADMIN — SODIUM CHLORIDE 150 ML/HR: 900 INJECTION, SOLUTION INTRAVENOUS at 06:17

## 2017-09-05 RX ADMIN — METRONIDAZOLE 500 MG: 500 INJECTION, SOLUTION INTRAVENOUS at 03:49

## 2017-09-05 RX ADMIN — Medication 10 ML: at 06:19

## 2017-09-05 RX ADMIN — LEVOFLOXACIN 750 MG: 5 INJECTION, SOLUTION INTRAVENOUS at 18:35

## 2017-09-05 RX ADMIN — Medication 1 CAPSULE: at 09:29

## 2017-09-05 RX ADMIN — Medication 10 ML: at 13:04

## 2017-09-05 RX ADMIN — ACETAMINOPHEN 650 MG: 325 TABLET ORAL at 22:20

## 2017-09-05 RX ADMIN — Medication 10 ML: at 21:00

## 2017-09-05 RX ADMIN — METRONIDAZOLE 500 MG: 500 INJECTION, SOLUTION INTRAVENOUS at 13:02

## 2017-09-05 RX ADMIN — METRONIDAZOLE 500 MG: 500 INJECTION, SOLUTION INTRAVENOUS at 20:46

## 2017-09-05 RX ADMIN — SODIUM CHLORIDE 100 ML/HR: 900 INJECTION, SOLUTION INTRAVENOUS at 17:06

## 2017-09-05 RX ADMIN — ONDANSETRON 4 MG: 2 INJECTION INTRAMUSCULAR; INTRAVENOUS at 04:09

## 2017-09-05 RX ADMIN — MORPHINE SULFATE 1 MG: 2 INJECTION, SOLUTION INTRAMUSCULAR; INTRAVENOUS at 04:09

## 2017-09-05 RX ADMIN — MORPHINE SULFATE 1 MG: 2 INJECTION, SOLUTION INTRAMUSCULAR; INTRAVENOUS at 09:56

## 2017-09-05 NOTE — PROGRESS NOTES
Problem: Falls - Risk of  Goal: *Absence of Falls  Document Mary Fall Risk and appropriate interventions in the flowsheet.    Outcome: Progressing Towards Goal  Fall Risk Interventions:              Medication Interventions: Patient to call before getting OOB, Teach patient to arise slowly

## 2017-09-05 NOTE — ED NOTES
Bedside and Verbal shift change report given to [address] [place] [address] Avenue (oncoming nurse) by Sandy Dunaway (offgoing nurse). Report included the following information SBAR, ED Summary, Intake/Output, MAR and Recent Results. Monitor x 2. Call bell in reach. Bed in lowest position, with side rails up x 2. Pt updated on plan of care. Family at bedside. IV ABT infusing as ordered. Third bolus infusing then maintenance fluid to be started. Peng Early

## 2017-09-05 NOTE — PROGRESS NOTES
Oncology Nursing Communication Tool      7:00 AM  9/5/2017     Bedside shift change report given to Viet Horton RN (incoming nurse) by Columba Hyatt RN (outgoing nurse) on Ana Cristina Simon a 28 y.o. female who was admitted on 9/4/2017  3:42 PM. Report included the following information SBAR, Intake/Output, MAR, Recent Results and Med Rec Status. Significant changes during shift: Symptoms managed      Issues for physician to address: N/A            Code Status: Full Code     Infections: No current active infections     Allergies: Review of patient's allergies indicates no known allergies.      Current diet: DIET CLEAR LIQUID       Pain Controlled [x] yes [] no   Bowel Movement [x] yes [] no   Last Bowel Movement (date)     9/5            Vital Signs:   Patient Vitals for the past 12 hrs:   Temp Pulse Resp BP SpO2   09/04/17 2238 98.5 °F (36.9 °C) (!) 101 17 114/60 98 %   09/04/17 2001 - - - 110/84 98 %   09/04/17 1915 - - - 125/79 98 %      Intake & Output:     Intake/Output Summary (Last 24 hours) at 09/05/17 0700  Last data filed at 09/05/17 0650   Gross per 24 hour   Intake           1777.5 ml   Output                0 ml   Net           1777.5 ml      Laboratory Results:     Recent Results (from the past 12 hour(s))   LACTIC ACID    Collection Time: 09/04/17  8:40 PM   Result Value Ref Range    Lactic acid 1.4 0.4 - 2.0 MMOL/L   METABOLIC PANEL, COMPREHENSIVE    Collection Time: 09/05/17  3:59 AM   Result Value Ref Range    Sodium 139 136 - 145 mmol/L    Potassium 3.8 3.5 - 5.1 mmol/L    Chloride 111 (H) 97 - 108 mmol/L    CO2 21 21 - 32 mmol/L    Anion gap 7 5 - 15 mmol/L    Glucose 104 (H) 65 - 100 mg/dL    BUN 15 6 - 20 MG/DL    Creatinine 0.69 0.55 - 1.02 MG/DL    BUN/Creatinine ratio 22 (H) 12 - 20      GFR est AA >60 >60 ml/min/1.73m2    GFR est non-AA >60 >60 ml/min/1.73m2    Calcium 6.7 (L) 8.5 - 10.1 MG/DL    Bilirubin, total 0.5 0.2 - 1.0 MG/DL    ALT (SGPT) 35 12 - 78 U/L AST (SGOT) 27 15 - 37 U/L    Alk. phosphatase 60 45 - 117 U/L    Protein, total 5.0 (L) 6.4 - 8.2 g/dL    Albumin 2.4 (L) 3.5 - 5.0 g/dL    Globulin 2.6 2.0 - 4.0 g/dL    A-G Ratio 0.9 (L) 1.1 - 2.2     CBC WITH AUTOMATED DIFF    Collection Time: 09/05/17  3:59 AM   Result Value Ref Range    WBC 17.2 (H) 3.6 - 11.0 K/uL    RBC 4.45 3.80 - 5.20 M/uL    HGB 14.5 11.5 - 16.0 g/dL    HCT 41.8 35.0 - 47.0 %    MCV 93.9 80.0 - 99.0 FL    MCH 32.6 26.0 - 34.0 PG    MCHC 34.7 30.0 - 36.5 g/dL    RDW 13.0 11.5 - 14.5 %    PLATELET 349 233 - 334 K/uL    NEUTROPHILS 67 32 - 75 %    LYMPHOCYTES 24 12 - 49 %    MONOCYTES 8 5 - 13 %    EOSINOPHILS 1 0 - 7 %    BASOPHILS 0 0 - 1 %    ABS. NEUTROPHILS 11.5 (H) 1.8 - 8.0 K/UL    ABS. LYMPHOCYTES 4.2 (H) 0.8 - 3.5 K/UL    ABS. MONOCYTES 1.4 (H) 0.0 - 1.0 K/UL    ABS. EOSINOPHILS 0.1 0.0 - 0.4 K/UL    ABS. BASOPHILS 0.0 0.0 - 0.1 K/UL   LACTIC ACID    Collection Time: 09/05/17  3:59 AM   Result Value Ref Range    Lactic acid 0.6 0.4 - 2.0 MMOL/L              Opportunity for questions and clarifications were given to the incoming nurse. Patient's bed is in low position, side rails x2, door open PRN, call bell within reach and patient not in distress.       Tristan Locke RN

## 2017-09-05 NOTE — ED NOTES
TRANSFER - OUT REPORT:    Verbal report given to KATHLEEN Rocha(name) on Iggy Resendiz  being transferred to 1118(unit) for routine progression of care       Report consisted of patients Situation, Background, Assessment and   Recommendations(SBAR). Information from the following report(s) SBAR, Kardex and MAR was reviewed with the receiving nurse. Lines:   Peripheral IV 09/04/17 Left;Upper Arm (Active)   Site Assessment Clean, dry, & intact 9/4/2017  4:34 PM   Dressing Status Clean, dry, & intact 9/4/2017  4:34 PM        Opportunity for questions and clarification was provided.       Patient transported with:  transportation

## 2017-09-05 NOTE — PROGRESS NOTES
Hospitalist Progress Note    NAME: Yordan Morris   :  1990   MRN:  [id number]       Assessment / Plan:  Sepsis POA - resolved  Nausea vomiting, diarrhea POA- improved  Abdominal pain POA- now just soreness, improved  MAYNOR (acute kidney injury) POA- resolved  Hyponatremia (mild/borderline) POA- now resolved  Due to Enterocolitis POA- on CT    Decrease IVF today  Follow Stool for C.diff, Cultures, WBCs, Ova / Parasite, contact precautions for now  Tolerating Clear liquid diet , wants to eat more, will advance diet to GI lite  PRN 1mg IV morphine  IV Levaquin and Flagyl for now- change to PO in 24 hrs if improved  Add [place]ari Q    Smoker  Offered nicotine patch       Code status: Full  Prophylaxis: Lovenox  Recommended Disposition: Home w/Family     Subjective:     Chief Complaint / Reason for Physician Visit: F/U Diarrhea, enterocolitis  \"I feel little better\". Discussed with RN events overnight. Review of Systems:  Symptom Y/N Comments  Symptom Y/N Comments   Fever/Chills n   Chest Pain n    Poor Appetite n   Edema n    Cough n   Abdominal Pain y soreness   Sputum n   Joint Pain n    SOB/VITAL n   Pruritis/Rash     Nausea/vomit n   Tolerating PT/OT y    Diarrhea y   Tolerating Diet y GI lite   Constipation    Other       Could NOT obtain due to:      Objective:     VITALS:   Last 24hrs VS reviewed since prior progress note.  Most recent are:  Patient Vitals for the past 24 hrs:   Temp Pulse Resp BP SpO2   17 0800 97.7 °F (36.5 °C) 87 16 110/71 99 %   17 2238 98.5 °F (36.9 °C) (!) 101 17 114/60 98 %   17 - - - 110/84 98 %   17 1915 - - - 125/79 98 %   17 1747 - (!) 118 16 111/75 99 %   17 1700 - - - 122/89 100 %   17 1615 - (!) 138 - (!) 110/98 97 %   17 1537 97.7 °F (36.5 °C) (!) 158 18 115/86 97 %       Intake/Output Summary (Last 24 hours) at 17 1405  Last data filed at 17 0650   Gross per 24 hour   Intake           1777.5 ml   Output 0 ml   Net           1777.5 ml        PHYSICAL EXAM:  General: WD, WN. Alert, cooperative, no acute distress    EENT:  EOMI. Anicteric sclerae. MMM  Resp:  CTA bilaterally, no wheezing or rales. No accessory muscle use  CV:  Regular  rhythm,  No edema  GI:  Soft, Non distended, RLQ tenderness noted +.  +Bowel sounds  Neurologic:  Alert and oriented X 3, normal speech,   Psych:   Good insight. Not anxious nor agitated  Skin:  No rashes. No jaundice    Reviewed most current lab test results and cultures  YES  Reviewed most current radiology test results   YES  Review and summation of old records today    NO  Reviewed patient's current orders and MAR    YES  PMH/SH reviewed - no change compared to H&P  ________________________________________________________________________  Care Plan discussed with:    Comments   Patient x    Family  x Mother at bedside   RN x    Care Manager     Consultant                        Multidiciplinary team rounds were held today with , nursing, pharmacist and clinical coordinator. Patient's plan of care was discussed; medications were reviewed and discharge planning was addressed. ________________________________________________________________________  Total NON critical care TIME:  36   Minutes    Total CRITICAL CARE TIME Spent:   Minutes non procedure based      Comments   >50% of visit spent in counseling and coordination of care     ________________________________________________________________________  Nazanin Allison MD     Procedures: see electronic medical records for all procedures/Xrays and details which were not copied into this note but were reviewed prior to creation of Plan. LABS:  I reviewed today's most current labs and imaging studies.   Pertinent labs include:  Recent Labs      09/05/17   0359  09/04/17   1617   WBC  17.2*  29.4*   HGB  14.5  19.1*   HCT  41.8  54.9*   PLT  243  367     Recent Labs      09/05/17   0359  09/04/17 1617   NA  139  135*   K  3.8  4.7   CL  111*  101   CO2  21  25   GLU  104*  154*   BUN  15  24*   CREA  0.69  1.35*   CA  6.7*  8.0*   ALB  2.4*  3.4*   TBILI  0.5  0.6   SGOT  27  53*   ALT  35  50       Signed: Anna Mera MD

## 2017-09-05 NOTE — PROGRESS NOTES
Oncology Nursing Communication Tool      7:32 PM  9/5/2017     Bedside and Verbal shift change report given to Kendra Garcia RN (incoming nurse) by Kermit Martinez (outgoing nurse) on Maye Dubose a 28 y.o. female who was admitted on 9/4/2017  3:42 PM. Report included the following information SBAR, Kardex, Intake/Output, MAR, Accordion and Recent Results. Significant changes during shift: Pain controlled, tolerating antibiotics well      Issues for physician to address: None            Code Status: Full Code     Infections: No current active infections     Allergies: Review of patient's allergies indicates no known allergies. Current diet: DIET GI LITE (POST SURGICAL)       Pain Controlled [x] yes [] no   Bowel Movement [] yes [x] no   Last Bowel Movement (date)     09/04/17            Vital Signs:   Patient Vitals for the past 12 hrs:   Temp Pulse Resp BP SpO2   09/05/17 1430 97.6 °F (36.4 °C) 92 16 104/64 98 %   09/05/17 0800 97.7 °F (36.5 °C) 87 16 110/71 99 %      Intake & Output:     Intake/Output Summary (Last 24 hours) at 09/05/17 1932  Last data filed at 09/05/17 0650   Gross per 24 hour   Intake           1777.5 ml   Output                0 ml   Net           1777.5 ml      Laboratory Results:   No results found for this or any previous visit (from the past 12 hour(s)). Opportunity for questions and clarifications were given to the incoming nurse. Patient's bed is in low position, side rails x2, door open PRN, call bell within reach and patient not in distress.       Kermit Martinez

## 2017-09-05 NOTE — PROGRESS NOTES
TRANSFER - IN REPORT:    Verbal report received from Chula(name) on [place] Reyes  being received from ED(unit) for routine progression of care      Report consisted of patients Situation, Background, Assessment and   Recommendations(SBAR). Information from the following report(s) SBAR, Kardex, Intake/Output, MAR and Recent Results was reviewed with the receiving nurse. Opportunity for questions and clarification was provided. Assessment completed upon patients arrival to unit and care assumed.        Primary Nurse Nayeli Batista RN and Lio Elise RN performed a dual skin assessment on this patient No impairment noted  Pankaj score is 23

## 2017-09-06 VITALS
RESPIRATION RATE: 18 BRPM | HEART RATE: 86 BPM | SYSTOLIC BLOOD PRESSURE: 112 MMHG | BODY MASS INDEX: 28.79 KG/M2 | HEIGHT: 64 IN | DIASTOLIC BLOOD PRESSURE: 65 MMHG | TEMPERATURE: 98.1 F | WEIGHT: 168.65 LBS | OXYGEN SATURATION: 92 %

## 2017-09-06 LAB
BACTERIA SPEC CULT: NORMAL
BACTERIA SPEC CULT: NORMAL
C JEJUNI+C COLI AG STL QL: NEGATIVE
CC UR VC: NORMAL
E COLI SXT1+2 STL IA: NEGATIVE
ERYTHROCYTE [DISTWIDTH] IN BLOOD BY AUTOMATED COUNT: 12.8 % (ref 11.5–14.5)
HCT VFR BLD AUTO: 36.4 % (ref 35–47)
HGB BLD-MCNC: 12.5 G/DL (ref 11.5–16)
MCH RBC QN AUTO: 33.2 PG (ref 26–34)
MCHC RBC AUTO-ENTMCNC: 34.3 G/DL (ref 30–36.5)
MCV RBC AUTO: 96.8 FL (ref 80–99)
PLATELET # BLD AUTO: 209 K/UL (ref 150–400)
RBC # BLD AUTO: 3.76 M/UL (ref 3.8–5.2)
SERVICE CMNT-IMP: NORMAL
SERVICE CMNT-IMP: NORMAL
WBC # BLD AUTO: 9.6 K/UL (ref 3.6–11)
WBC #/AREA STL HPF: NORMAL /HPF (ref 0–4)

## 2017-09-06 PROCEDURE — 85027 COMPLETE CBC AUTOMATED: CPT | Performed by: INTERNAL MEDICINE

## 2017-09-06 PROCEDURE — 74011000250 HC RX REV CODE- 250: Performed by: INTERNAL MEDICINE

## 2017-09-06 PROCEDURE — 36415 COLL VENOUS BLD VENIPUNCTURE: CPT | Performed by: INTERNAL MEDICINE

## 2017-09-06 PROCEDURE — 74011250637 HC RX REV CODE- 250/637: Performed by: INTERNAL MEDICINE

## 2017-09-06 PROCEDURE — 74011250636 HC RX REV CODE- 250/636: Performed by: INTERNAL MEDICINE

## 2017-09-06 RX ORDER — METRONIDAZOLE 500 MG/1
500 TABLET ORAL 3 TIMES DAILY
Qty: 15 TAB | Refills: 0 | Status: SHIPPED | OUTPATIENT
Start: 2017-09-06 | End: 2017-09-11

## 2017-09-06 RX ORDER — CIPROFLOXACIN 500 MG/1
500 TABLET ORAL 2 TIMES DAILY
Qty: 10 TAB | Refills: 0 | Status: SHIPPED | OUTPATIENT
Start: 2017-09-06 | End: 2017-09-11

## 2017-09-06 RX ORDER — ONDANSETRON 4 MG/1
4 TABLET, FILM COATED ORAL
Qty: 20 TAB | Refills: 0 | Status: SHIPPED | OUTPATIENT
Start: 2017-09-06

## 2017-09-06 RX ADMIN — METRONIDAZOLE 500 MG: 500 INJECTION, SOLUTION INTRAVENOUS at 04:18

## 2017-09-06 RX ADMIN — SODIUM CHLORIDE 100 ML/HR: 900 INJECTION, SOLUTION INTRAVENOUS at 04:18

## 2017-09-06 RX ADMIN — Medication 10 ML: at 05:32

## 2017-09-06 RX ADMIN — ACETAMINOPHEN 650 MG: 325 TABLET ORAL at 08:21

## 2017-09-06 RX ADMIN — Medication 1 CAPSULE: at 08:08

## 2017-09-06 NOTE — PROGRESS NOTES
I have reviewed discharge instructions with the patient. The patient verbalized understanding. Discharge medications reviewed with patient and appropriate educational materials and side effects teaching were provided. Follow-up appointment was discussed with case management over phone. IV was removed. Patient belongings were gathered and brought to vehicle by male visitor. Patient wheeled off unit by RN to vehicle to be driven home by visitor.

## 2017-09-06 NOTE — DISCHARGE SUMMARY
Hospitalist Discharge Summary     Patient ID:  Anjelica Ashley  [id number]  42 y.o.  1990    PCP on record: None    Admit date: 9/4/2017  Discharge date and time: 9/6/2017      DISCHARGE DIAGNOSIS:    Sepsis POA - resolved  Nausea vomiting, diarrhea POA- improved  Abdominal pain POA- now just soreness, improved  MAYNOR (acute kidney injury) POA- resolved  Hyponatremia (mild/borderline) POA- now resolved  Due to Enterocolitis POA- on CT - cont PO Abx as prescribed x 5 more days        CONSULTATIONS:  None    Excerpted HPI from H&P of Alicia Whitten MD:  [age] y.o.  female who presents with Nausea, vomiting, diarrhea and abdominal pain. As per patient, symptoms started yesterday evening. Pt reported constant, nausea, vomiting, diarrhea with ? 10 episodes since last night (she doesn't know the exact count). Pt reported diffuse abdominal pain, cramping in nature, 6-8/10 in intensity, constant. Pt denies any fever, chills, chest pain, problems urination. Pt also denies recent travel, recent abx use, sick contact and eating anything unusual.      We were asked to admit for work up and evaluation of the above problems. \"    ______________________________________________________________________  DISCHARGE SUMMARY/HOSPITAL COURSE:  for full details see H&P, daily progress notes, labs, consult notes.      Sepsis POA - resolved  Nausea vomiting, diarrhea POA- improved  Abdominal pain POA- now just soreness, improved  MAYNOR (acute kidney injury) POA- resolved  Hyponatremia (mild/borderline) POA- now resolved  Due to Enterocolitis POA- on CT     Decrease IVF today  Follow Stool for C.diff, Cultures, WBCs, Ova / Parasite, contact precautions for now  Tolerating Clear liquid diet , wants to eat more, will advance diet to GI lite  PRN 1mg IV morphine  IV Levaquin and Flagyl for now- change to PO in 24 hrs if improved  Add [place] Q     Smoker        _______________________________________________________________________  Patient seen and examined by me on discharge day. Pertinent Findings:  Gen:    Not in distress  Chest: Clear lungs  CVS:   Regular rhythm. No edema  Abd:  Soft, not distended, not tender  Neuro:  Alert, oriented x 3  _______________________________________________________________________  DISCHARGE MEDICATIONS:   Current Discharge Medication List      START taking these medications    Details   L. acidoph & paracasei- S therm- Bifido (JENNIFFER-Q/RISAQUAD) 8 billion cell cap cap Take 1 Cap by mouth daily. Qty: 7 Cap, Refills: 0      ondansetron hcl (ZOFRAN, AS HYDROCHLORIDE,) 4 mg tablet Take 1 Tab by mouth every eight (8) hours as needed for Nausea. Qty: 20 Tab, Refills: 0      metroNIDAZOLE (FLAGYL) 500 mg tablet Take 1 Tab by mouth three (3) times daily for 5 days. Qty: 15 Tab, Refills: 0      ciprofloxacin HCl (CIPRO) 500 mg tablet Take 1 Tab by mouth two (2) times a day for 5 days. Qty: 10 Tab, Refills: 0             My Recommended Diet, Activity, Wound Care, and follow-up labs are listed in the patient's Discharge Insturctions which I have personally completed and reviewed.     _______________________________________________________________________  DISPOSITION:    Home with Family: x   Home with HH/PT/OT/RN:    SNF/LTC:    RODNEY:    OTHER:        Condition at Discharge:  Stable  _______________________________________________________________________  Follow up with:   PCP : None  Follow-up Information     Follow up With Details Comments Contact Info    None   None ([phone]) Patient stated that they have no PCP                Total time in minutes spent coordinating this discharge (includes going over instructions, follow-up, prescriptions, and preparing report for sign off to her PCP) :  26 minutes    Signed:  Bon Berman MD

## 2017-09-06 NOTE — PROGRESS NOTES
I tried to schedule a PCP f/u but the office was closed for lunch from 12 to 1pm.  I called the patient and she said she would be gone before 1pm.  I asked her to call her PCP to schedule a f/u and she said that she would.

## 2017-09-06 NOTE — ROUTINE PROCESS
Oncology Nursing Communication Tool      8:14 AM  9/6/2017     Bedside shift change report given to Avis Morales RN (incoming nurse) by Milind Maynard RN (outgoing nurse) on Mica Oh a 28 y.o. female who was admitted on 9/4/2017  3:42 PM. Report included the following information SBAR, Kardex, Intake/Output, MAR and Recent Results. Significant changes during shift: pt still with mild lower abd cramping. Med x2 overnight. WBC now wnl, pt anticipates discharge this am. No stools overnight. Issues for physician to address: none            Code Status: Full Code     Infections: No current active infections     Allergies: Review of patient's allergies indicates no known allergies. Current diet: DIET GI LITE (POST SURGICAL)       Pain Controlled [x] yes [] no   Bowel Movement [] yes [x] no   Last Bowel Movement (date) 9/4/17            Vital Signs:   Patient Vitals for the past 12 hrs:   Temp Pulse Resp BP SpO2   09/06/17 0730 98.1 °F (36.7 °C) 86 18 112/65 92 %      Intake & Output:     Intake/Output Summary (Last 24 hours) at 09/06/17 0814  Last data filed at 09/06/17 0630   Gross per 24 hour   Intake          1416.66 ml   Output                0 ml   Net          1416.66 ml      Laboratory Results:     Recent Results (from the past 12 hour(s))   CBC W/O DIFF    Collection Time: 09/06/17  4:24 AM   Result Value Ref Range    WBC 9.6 3.6 - 11.0 K/uL    RBC 3.76 (L) 3.80 - 5.20 M/uL    HGB 12.5 11.5 - 16.0 g/dL    HCT 36.4 35.0 - 47.0 %    MCV 96.8 80.0 - 99.0 FL    MCH 33.2 26.0 - 34.0 PG    MCHC 34.3 30.0 - 36.5 g/dL    RDW 12.8 11.5 - 14.5 %    PLATELET 850 508 - 687 K/uL              Opportunity for questions and clarifications were given to the incoming nurse. Patient's bed is in low position, side rails x2, door open PRN, call bell within reach and patient not in distress.       Milind Maynard RN

## 2017-09-06 NOTE — DISCHARGE INSTRUCTIONS
HOSPITALIST DISCHARGE INSTRUCTIONS    NAME: Dian Curry   :  1990   MRN:  [id number]     Date/Time:  2017 11:44 AM    ADMIT DATE: 2017     DISCHARGE DATE: 2017     DISCHARGE DIAGNOSIS:  Sepsis POA - resolved  Nausea vomiting, diarrhea POA- improved  Abdominal pain POA- now just soreness, improved  MAYNOR (acute kidney injury) POA- resolved  Hyponatremia (mild/borderline) POA- now resolved  Due to Enterocolitis POA- on CT - cont PO Abx as prescribed x 5 more days    Active Problems:    Sepsis ([facility] [address] (2017)      Nausea vomiting and diarrhea (2017)      Colitis, enteritis, and gastroenteritis of presumed infectious origin (2017)      MAYNOR (acute kidney injury) ([facility] [address] (2017)      Hyponatremia (2017)      Severe dehydration (2017)         MEDICATIONS:  As per medication reconciliation  list  · It is important that you take the medication exactly as they are prescribed. · Keep your medication in the bottles provided by the pharmacist and keep a list of the medication names, dosages, and times to be taken in your wallet. · Do not take other medications without consulting your doctor. Pain Management: per above medications    What to do at Home    Recommended diet:  Regular Diet    Recommended activity: Activity as tolerated    If you have questions regarding the hospital related prescriptions or [facility] related issues [phone].     If you experience any of the following symptoms then please call your primary care physician or return to the emergency room if you cannot get hold of your doctor:  Fever, chills, nausea, vomiting, diarrhea, change in mentation, falling, bleeding, shortness of breath,     Follow Up:  GI doctor of your choice if diarrhea/Abd pain persists beyond 7 days or recurrence happens for no obvious reason-- may need Colonoscopy as discussed      Information obtained by :  I understand that if any problems occur once I am at home I am to contact my physician. I understand and acknowledge receipt of the instructions indicated above.                                                                                                                                            Physician's or R.N.'s Signature                                                                  Date/Time                                                                                                                                              Patient or Representative Signature                                                          Date/Time

## 2017-09-06 NOTE — PROGRESS NOTES
[address] September 6, 2017       RE: Carmella Goldstein      To Whom It May Concern,    This is to certify that Carmella Goldstein was admitted to [facility] under hospital medicine service on 9/4/2017 & has been medically cleared to be discharged home today. Patient may resume work duties as able in next couple of days. Please feel free to contact my office if you have any questions or concerns. Thank you for your assistance in this matter.       Sincerely,  Jami Nj MD

## 2017-09-07 LAB
O+P SPEC MICRO: NORMAL
O+P STL CONC: NORMAL
SPECIMEN SOURCE: NORMAL